# Patient Record
Sex: MALE | Race: WHITE
[De-identification: names, ages, dates, MRNs, and addresses within clinical notes are randomized per-mention and may not be internally consistent; named-entity substitution may affect disease eponyms.]

---

## 2018-02-23 ENCOUNTER — HOSPITAL ENCOUNTER (EMERGENCY)
Dept: HOSPITAL 68 - ERH | Age: 28
End: 2018-02-23
Payer: COMMERCIAL

## 2018-02-23 VITALS — DIASTOLIC BLOOD PRESSURE: 73 MMHG | SYSTOLIC BLOOD PRESSURE: 148 MMHG
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
## 2018-02-23 DIAGNOSIS — S02.5XXA: Primary | ICD-10-CM
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
## 2018-02-23 NOTE — ED THROAT/DENTAL COMPLAINT
History of Present Illness
 
General
Chief Complaint: Sore Throat, Dental Pain
Stated Complaint: PT IS HAVING TOOTH PAIN
Source: patient, family
Exam Limitations: no limitations
 
Vital Signs & Intake/Output
Vital Signs & Intake/Output
 Vital Signs
 
 
Date Time Temp Pulse Resp B/P B/P Pulse O2 O2 Flow FiO2
 
     Mean Ox Delivery Rate 
 
02/23 1958 97.0 82 22 148/73  99   
 
 
 
Allergies
Coded Allergies:
bee venom protein (honey bee) (Severe, ANAPHYLAXIS 12/07/17)
codeine (Intermediate, HIVES 12/07/17)
 
Reconcile Medications
Amoxicillin/Potassium Clav (Augmentin 875-125 Tablet) 875 MG-125 MG TABLET   1 
TAB PO BID TOOTH
Amoxicillin/Potassium Clav (Augmentin 875-125 Tablet) 875 MG-125 MG TABLET   1 
TAB PO BID DENTAL INFECTION 
Lidocaine HCl (Lidocaine HCl Viscous) 2 % SOLUTION   5 ML PO TID PAIN
Oxycodone HCl/Acetaminophen (Percocet 5-325 MG Tablet) 5 MG-325 MG TABLET   1 
TAB PO TID PAIN
Oxycodone HCl/Acetaminophen (Percocet 5-325 MG Tablet) 5 MG-325 MG TABLET   1 
TAB PO TID PRN PAIN 
 
Triage Note:
PER PT BROKE A TOOTH 2 DAYS AGO TONIGHT PAIN TOO
 INTENSE
 AMBESOL NOT WORKING
Triage Nurses Notes Reviewed? yes
HPI:
27M no PMH with 2 days of left lower tooth pain after breaking his tooth biting 
into a chicken bone.  Has a dental appointment for 4 days from now and has been 
using Anbesol for the pain which has been keeping it controlled until today.  
Today the pain has been unbearable and the patient is visibly uncomfortable and 
in pain.  He denies fever, chills, n/v, spreading of pain, swelling, discharge, 
erythema, neck stiffness, headache, vision changes.
 
Past History
 
Travel History
Traveled to [place] past 21 day No
 
Medical History
Any Pertinent Medical History? see below for history
Neurological: NONE
EENT: NONE
Cardiovascular: NONE
Respiratory: NONE
Gastrointestinal: NONE
Hepatic: hepatitis C
Renal: NONE
Musculoskeletal: NONE
Psychiatric: ADHD
Endocrine: NONE
Blood Disorders: NONE
Cancer(s): NONE
GYN/Reproductive: NONE
 
Surgical History
Surgical History: non-contributory
 
Psychosocial History
What is your primary language English
Tobacco Use: Current Daily Use
Daily Tobacco Use Amount/Type: => 5 Cigarettes daily
 
Family History
Hx Contributory? No
 
Review of Systems
 
Review of Systems
Constitutional:
Reports: no symptoms. 
EENTM:
Reports: no symptoms. 
Respiratory:
Reports: no symptoms. 
Cardiovascular:
Reports: no symptoms. 
GI:
Reports: no symptoms. 
Genitourinary:
Reports: no symptoms. 
Musculoskeletal:
Reports: no symptoms. 
Skin:
Reports: no symptoms. 
Neurological/Psychological:
Reports: no symptoms. 
Hematologic/Endocrine:
Reports: no symptoms. 
Immunologic/Allergic:
Reports: no symptoms. 
All Other Systems: Reviewed and Negative
 
Physical Exam
 
Physical Exam
General Appearance: well developed/nourished, moderate distress
Head: atraumatic, normal appearance
Eyes:
Bilateral: normal appearance. 
Nose: normal inspection
Mouth/Throat: left lower molar cracked with surrounding erythema
Neck: normal inspection, supple, full range of motion
Cardiovascular/Respiratory: normal breath sounds, regular rate/rhythm
Back: normal inspection, normal range of motion
Neurologic/Psych: awake, alert, oriented x 3, normal mood/affect
Skin: intact, normal color, warm/dry
 
Core Measures
ACS in differential dx? No
Sepsis Present: No
Sepsis Focused Exam Completed? No
 
Progress
Differential Diagnosis: aspirated tooth, carious tooth, epiglottitis, Ludwigs 
angina, meningitis, odontogenic abscess, divya-tonsillar abscess, pharyngeal for.
body, stomatitis/gingivitis, strep pharyngitis, tooth fracture
Plan of Care:
Will give local and IM pain medications and discharge with outpatient dental 
follow up.  Improved after lidocaine injection.
 
Departure
 
Departure
Disposition: HOME OR SELF CARE
Condition: Stable
Clinical Impression
Primary Impression: Broken tooth
Referrals:
Patient Has No Primary Care Dr (PCP/Family)
 
Additional Instructions:
Follow up with your dentist or oral surgeon as soon as possible.  If you note 
any new or worsening symptoms, including fever, chills, jaw pain, sinus pain, 
neck stiffness, confusion, changes in vision or hearing, return to emergency 
department.
Departure Forms:
Customer Survey
General Discharge Information
Prescriptions:
Current Visit Scripts
Oxycodone HCl/Acetaminophen (Percocet 5-325 MG Tablet) 1 TAB PO TID  
     #15 TAB 
 
Lidocaine HCl (Lidocaine HCl Viscous) 5 ML PO TID  
     #100 ML 
 
Amoxicillin/Potassium Clav (Augmentin 875-125 Tablet) 1 TAB PO BID  
     #14 TAB

## 2018-04-23 ENCOUNTER — HOSPITAL ENCOUNTER (EMERGENCY)
Dept: HOSPITAL 68 - ERH | Age: 28
End: 2018-04-23
Payer: COMMERCIAL

## 2018-04-23 VITALS — BODY MASS INDEX: 25.11 KG/M2 | WEIGHT: 160 LBS | HEIGHT: 67 IN

## 2018-04-23 VITALS — SYSTOLIC BLOOD PRESSURE: 135 MMHG | DIASTOLIC BLOOD PRESSURE: 72 MMHG

## 2018-04-23 DIAGNOSIS — R05: ICD-10-CM

## 2018-04-23 DIAGNOSIS — R11.2: ICD-10-CM

## 2018-04-23 DIAGNOSIS — F11.10: Primary | ICD-10-CM

## 2018-04-23 LAB
ABSOLUTE GRANULOCYTE CT: 8.3 /CUMM (ref 1.4–6.5)
BASOPHILS # BLD: 0.2 /CUMM (ref 0–0.2)
BASOPHILS NFR BLD: 1.8 % (ref 0–2)
EOSINOPHIL # BLD: 0.3 /CUMM (ref 0–0.7)
EOSINOPHIL NFR BLD: 1.9 % (ref 0–5)
ERYTHROCYTE [DISTWIDTH] IN BLOOD BY AUTOMATED COUNT: 14.1 % (ref 11.5–14.5)
GRANULOCYTES NFR BLD: 59.6 % (ref 42.2–75.2)
HCT VFR BLD CALC: 40.1 % (ref 42–52)
LYMPHOCYTES # BLD: 3.9 /CUMM (ref 1.2–3.4)
MCH RBC QN AUTO: 27.9 PG (ref 27–31)
MCHC RBC AUTO-ENTMCNC: 32.8 G/DL (ref 33–37)
MCV RBC AUTO: 85.1 FL (ref 80–94)
MONOCYTES # BLD: 1.2 /CUMM (ref 0.1–0.6)
PLATELET # BLD: 262 /CUMM (ref 130–400)
PMV BLD AUTO: 7.5 FL (ref 7.4–10.4)
RED BLOOD CELL CT: 4.72 /CUMM (ref 4.7–6.1)
WBC # BLD AUTO: 13.9 /CUMM (ref 4.8–10.8)

## 2018-04-23 PROCEDURE — G0480 DRUG TEST DEF 1-7 CLASSES: HCPCS

## 2018-04-23 NOTE — ED GENERAL ADULT
History of Present Illness
 
General
Chief Complaint: General Adult
Stated Complaint: N/V OD EARLIER TODAY ON OXYCODONE 80MG
Source: patient, family
Exam Limitations: intoxication
 
Vital Signs & Intake/Output
Vital Signs & Intake/Output
 Vital Signs
 
 
Date Time Temp Pulse Resp B/P B/P Pulse O2 O2 Flow FiO2
 
     Mean Ox Delivery Rate 
 
2225 98.0 68 18 135/72  98 Room Air Room Air 
 
1918 98.0 65 18 137/74  97 Room Air  
 
 
 
Allergies
Coded Allergies:
bee venom protein (honey bee) (Severe, ANAPHYLAXIS 17)
codeine (Intermediate, HIVES 17)
 
Reconcile Medications
Amoxicillin/Potassium Clav (Augmentin 875-125 Tablet) 875 MG-125 MG TABLET   1 
TAB PO BID TOOTH
Amoxicillin/Potassium Clav (Augmentin 875-125 Tablet) 875 MG-125 MG TABLET   1 
TAB PO BID DENTAL INFECTION 
Lidocaine HCl (Lidocaine HCl Viscous) 2 % SOLUTION   5 ML PO TID PAIN
Oxycodone HCl/Acetaminophen (Percocet 5-325 MG Tablet) 5 MG-325 MG TABLET   1 
TAB PO TID PAIN
Oxycodone HCl/Acetaminophen (Percocet 5-325 MG Tablet) 5 MG-325 MG TABLET   1 
TAB PO TID PRN PAIN 
 
Triage Note:
PT FROM HOME C/O OD ON OXYCODONE 80MG THIS
MORNING AT WORK. PTS GIRLFRIEND CONCERNED.
COWORKERS FOUND PT AT WORK ON THE GROUND,
UNWITNESSED FALL, +HEADSTRIKE, UNSURE OF LOC. PTS
GIRLFRIEND STATES HE WAS TAKEN TO [facility] AND THEN DISCHARGED. PTS HAS BEEN
VOMITTING X4 WITH BLOOD, PT HAS SLURRED SPEECH,
ANSWERS QUESTIONS APPROPRIATELY X3. PT IS
AMBULATORY WITH UNSTEADY GAIT. PT IS LETHARGIC,
DIAPHORESIS. PTS GF STATED NO CT SCANS OR XRAY
WERE COMPLETED AND THAT PT USUALLY DOES NOT HAVE A
DRUG "ISSUE" PT "HIS BACK HAS BEEN BOTHERING HIM
THATS WHY HE TOOK THE MEDICATION" PT IS DROOLING
SLUMPED OVER IN TRIAGE. CHARGE RN MADE AWARE
Triage Nurses Notes Reviewed? yes
Onset: Gradual
Duration: hour(s):
Timing: single episode today
Injury Environment: work
Severity: moderate
HPI:
26yo male presents to ED following accidental overdose on oxycodone 80mg this 
morning at work. HPI is obtained from patient's girlfriend due to his 
intoxication.  Patient had been complaining of back pain for several weeks and 
wife believes he took this medication to help with his pain.  She is unsure 
where he got the medication however she believes he got up from a coworker.  
Wife states that the patient's coworkers found him on the ground, they believe 
he passed out after taking the medication.  They believe that he has had however
they are unsure if he lost consciousness.  The patient was seen and evaluated at
[facility] and was discharged without imaging.  Girlfriend reports that 
he has had 4 episodes of vomiting and he is now having slurred speech with 
difficulty answering questions.  Girlfriend is having difficulty arousing the 
patient.  She states that he does not have a drug problem and this is new 
behavior for him.  Girlfriend also reports history of recent cough and 
hemoptysis.  Upon questioning the patient is denying pain, he denies suicidal 
ideation.
(Monica Guerrero)
 
Past History
 
Travel History
Traveled to [place] past 21 day No
 
Medical History
Any Pertinent Medical History? see below for history
Neurological: NONE
EENT: NONE
Cardiovascular: NONE
Respiratory: NONE
Gastrointestinal: NONE
Hepatic: hepatitis C
Renal: NONE
Musculoskeletal: NONE
Psychiatric: ADHD
Endocrine: NONE
Blood Disorders: NONE
Cancer(s): NONE
GYN/Reproductive: NONE
 
Surgical History
Surgical History: non-contributory
 
Psychosocial History
What is your primary language English
Tobacco Use: Current Daily Use
Daily Tobacco Use Amount/Type: => 5 Cigarettes daily
ETOH Use: occasional use
Illicit Drug Use: marijuana
 
Family History
Hx Contributory? No
(Monica Guerrero)
 
Review of Systems
 
Review of Systems
Constitutional:
Reports: see HPI. 
EENTM:
Reports: no symptoms. 
Respiratory:
Reports: see HPI. 
Cardiovascular:
Reports: no symptoms. 
GI:
Reports: see HPI. 
Genitourinary:
Reports: no symptoms. 
Musculoskeletal:
Reports: see HPI. 
Skin:
Reports: no symptoms. 
Neurological/Psychological:
Reports: see HPI. 
Hematologic/Endocrine:
Reports: no symptoms. 
Immunologic/Allergic:
Reports: no symptoms. 
All Other Systems: Reviewed and Negative
(Monica Guerrero)
 
Physical Exam
 
Physical Exam
General Appearance: well developed/nourished, no apparent distress, alert, awake
Head: atraumatic, normal appearance
Eyes:
Bilateral: normal appearance, other (pinpoint pupils). 
Ears, Nose, Throat: hearing grossly normal
Neck: normal inspection, supple, full range of motion, no midline tenderness
Respiratory: normal breath sounds, no respiratory distress, lungs clear
Cardiovascular: regular rate/rhythm
Gastrointestinal: normal bowel sounds, soft, non-tender, no organomegaly
Back: normal inspection, normal range of motion
Extremities: normal inspection, normal range of motion
Neurologic/Psych: sedated, arousable with verbal stimuli
Skin: intact, normal color, diaphoresis
 
Core Measures
ACS in differential dx? No
CVA/TIA Diagnosis: No
Sepsis Present: No
Sepsis Focused Exam Completed? No
(Nicole ANDREW,Monica Du)
 
Progress
Differential Diagnoses
I considered the following diagnoses in my evaluation of the patient: [drug 
overdose, polysubstance abuse, ICH, seizure, intracranial mass/lesion]
 
Plan of Care:
 Orders
 
 
Procedure Date/time Status
 
Add-on Test (ER Only) 2100 Active
 
TROPONIN LEVEL 2020 Complete
 
EKG 1959 Active
 
Add-on Test (ER Only) 1956 Active
 
URINE DRUG SCREEN FOR ER ONLY 1912 Complete
 
ACETOMINOPHEN 1912 Complete
 
SALICYLATE 1912 Complete
 
D-DIMER 1912 Complete
 
COMPREHENSIVE METABOLIC PANEL 1912 Complete
 
CBC WITHOUT DIFFERENTIAL 1912 Complete
 
 
 Laboratory Tests
 
 
 
18:
Urine Opiates Screen > 4000.00  H, Methadone Screen < 40, Barbiturate Screen < 
60, Ur Phencyclidine Scrn < 6.00, Amphetamines Screen < 100, U Benzodiazepines 
Scrn < 85, Urine Cocaine Screen 904  H, Urine Cannabis Screen > 80.00  H
 
18 2020:
Anion Gap 8, Estimated GFR > 60, BUN/Creatinine Ratio 11.8, Glucose 81, Calcium 
9.6, Total Bilirubin 0.6, AST 43, ALT 76  H, Alkaline Phosphatase 65, Troponin I
< 0.01, Total Protein 7.5, Albumin 4.7, Globulin 2.8, Albumin/Globulin Ratio 1.7
, D-Dimer High Sensitivty < 200, CBC w Diff NO MAN DIFF REQ, RBC 4.72, MCV 85.1,
MCH 27.9, MCHC 32.8  L, RDW 14.1, MPV 7.5, Gran % 59.6, Lymphocytes % 27.8, 
Monocytes % 8.9, Eosinophils % 1.9, Basophils % 1.8, Absolute Granulocytes 8.3  
H, Absolute Lymphocytes 3.9  H, Absolute Monocytes 1.2  H, Absolute Eosinophils 
0.3, Absolute Basophils 0.2, Salicylates < 1.0, Acetaminophen < 10.0  L
 
 
Head CT is WNL.  Patient's labs show cocaine, marijuana, and opiates in urine 
toxicology.  Patient's current mental status likely related to his significant 
level of opiates in his system.  Given patient's cough with possible hemoptysis 
d-dimer ordered, d-dimer is negative, low suspicion for pulmonary embolus and at
this time.  I offered chest x-ray however patient and wife prefer to leave at 
this time.  They were given a slip for an outpatient chest x-ray to obtain a 
patient has persistent cough symptoms.  Wife is a reliable source, she will take
care of the patient later tonight given his intoxicated state.  Wife and patient
agree with the plan of care.  The patient was discussed with Dr. Ni who 
agrees with this plan.
Diagnostic Imaging:
Viewed by Me: CT Scan.  Discussed w/RAD: CT Scan. 
Radiology Impression: PATIENT: BRADEN BRAUN  MEDICAL RECORD NO: [id number] PRESENT 
AGE: 27  PATIENT ACCOUNT NO: [id number] : 89  LOCATION: [facility] ORDERING 
PHYSICIAN: Monica ANDREW     SERVICE DATE:  EXAM TYPE: CAT -
CT CERV SPINE WO IV CONTRAST; CT HEAD WO IV CONTRAST EXAMINATION: HEAD CT 
WITHOUT CONTRAST CERVICAL SPINE CT WITHOUT CONTRAST CLINICAL INFORMATION: Fall 
with head trauma. Slurred speech and vomiting. COMPARISON: None. TECHNIQUE: 
Contiguous axial imaging of the head was performed without the administration of
IV contrast. Axial multidetector volumetric images were also performed through 
the cervical spine without contrast. Multiplanar reconstructed images in coronal
and sagittal orientations were submitted. DOSE: 953.44 mGy-cm FINDINGS: HEAD: 
There is no evidence of acute intracranial hemorrhage or territorial infarction.
No abnormal mass-effect or midline shift. No extra-axial fluid collections.  
Gray to white matter differentiation is well preserved. The ventricles are 
normal in size and configuration.   There is no abnormal attenuation within the 
brain parenchyma. The soft tissues and osseous structures are normal.  The 
sinuses and mastoid air cells are clear. CERVICAL SPINE: Vertebral body heights 
are normal. No fractures of the vertebral bodies or posterior elements. 
Vertebral alignment is normal. Mild straightening of the cervical spine is 
likely positional. No subluxation. The craniocervical and atlantoaxial 
articulations are normal.  Intervertebral disc heights are normal. No 
significant degenerative disc disease. Facet joints are normal. Central canal 
and neural foramina appear patent without appreciable stenoses. No significant 
paravertebral soft tissue swelling. Cervical soft tissues are unremarkable. 
Imaged portions of the lung apices are clear. IMPRESSION: 1. No acute 
intracranial pathology. 2. No acute fracture or malalignment in the cervical 
spine. DICTATED BY: Epi Ibarra MD  DATE/TIME DICTATED:18 
:JORDEN  DATE/TIME TRANSCRIBED:18 CONFIDENTIAL, 
DO NOT COPY WITHOUT APPROPRIATE AUTHORIZATION.  <Electronically signed in Other 
Vendor System>                                                                  
                     SIGNED BY: Epi Ibarra MD 18
Initial ED EKG: sinus rhythm @76bpm, nonspecific ST changes
(Nicole ANDREW,Monica Du)
 
Departure
 
Departure
Disposition: HOME OR SELF CARE
Condition: Stable
Clinical Impression
Primary Impression: Opiate abuse, episodic
Secondary Impressions: Cough, Nausea
Referrals:
Patient Has No Primary Care Dr (PCP/Family)
 
Additional Instructions:
It is recommended that you refrain from taking any type of opiate medication 
given your symptoms today. You were given a slip for an outpatient chest x-ray 
to follow-up with if you have persistent cough symptoms.  Also follow-up with 
your primary care doctor regarding today's visit.
 
Please note that there might be incidental findings in your evaluation that are 
unrelated to the current emergency department visit.  Please notify your primary
care doctor about this emergency department visit in order to obtain and review 
all of the testing performed so that these incidental findings can be monitored 
as needed.
 
If you had an x-ray performed, please understand that some fractures may not be 
seen on the initial set of x-rays.  If your symptoms persist you might need a 
repeat set of x-rays to check for such a fracture.
 
If you had a laceration evaluated, please understand that foreign bodies such as
glass or wood may not be visible to the naked eye or on plain x-rays.  If the 
wound becomes red, swollen, increasingly more painful or if there is any 
drainage from the wound, please have it reevaluated by a physician for the 
possibility of a retained foreign body.
 
If you're unable to follow up as outlined in the discharge instructions please 
return to the emergency department.
 
Thank you for choosing the [facility] Emergency Department for your care.
It was a pleasure to serve you today.
Departure Forms:
Customer Survey
General Discharge Information
(Monica Guerrero)
 
PA/NP Co-Sign Statement
Statement:
ED Attending supervision documentation-
 
[] I saw and evaluated the patient. I have also reviewed all the pertinent lab 
results and diagnostic results. I agree with the findings and the plan of care 
as documented in the PA's/NP's documentation. 
 
[x] I have reviewed the ED Record and agree with the PA's/NP's documentation.
 
[] Additions or exceptions (if any) to the PAs/NP's note and plan are 
summarized below:
[]
 
(Raine GREENFIELD,David TRUONG)
 
Critical Care Note
 
Critical Care Note
Critical Care Time: non-applicable
(Monica Guerrero)

## 2018-04-23 NOTE — CT SCAN REPORT
EXAMINATION:
HEAD CT WITHOUT CONTRAST
CERVICAL SPINE CT WITHOUT CONTRAST
 
CLINICAL INFORMATION: Fall with head trauma. Slurred speech and vomiting.
 
COMPARISON: None.
 
TECHNIQUE: Contiguous axial imaging of the head was performed without the
administration of IV contrast. Axial multidetector volumetric images were
also performed through the cervical spine without contrast. Multiplanar
reconstructed images in coronal and sagittal orientations were submitted.
 
DOSE: 953.44 mGy-cm
 
FINDINGS:
 
HEAD:
There is no evidence of acute intracranial hemorrhage or territorial
infarction. No abnormal mass-effect or midline shift. No extra-axial fluid
collections.  Gray to white matter differentiation is well preserved.
 
The ventricles are normal in size and configuration.   There is no abnormal
attenuation within the brain parenchyma.
 
The soft tissues and osseous structures are normal.  The sinuses and mastoid
air cells are clear.
 
CERVICAL SPINE:
Vertebral body heights are normal. No fractures of the vertebral bodies or
posterior elements. Vertebral alignment is normal. Mild straightening of the
cervical spine is likely positional. No subluxation.
 
The craniocervical and atlantoaxial articulations are normal.  Intervertebral
disc heights are normal. No significant degenerative disc disease. Facet
joints are normal.
 
Central canal and neural foramina appear patent without appreciable stenoses.
 
No significant paravertebral soft tissue swelling. Cervical soft tissues are
unremarkable.
 
Imaged portions of the lung apices are clear.
 
IMPRESSION:
1. No acute intracranial pathology.
2. No acute fracture or malalignment in the cervical spine.